# Patient Record
Sex: FEMALE | Race: WHITE | NOT HISPANIC OR LATINO | Employment: PART TIME | URBAN - METROPOLITAN AREA
[De-identification: names, ages, dates, MRNs, and addresses within clinical notes are randomized per-mention and may not be internally consistent; named-entity substitution may affect disease eponyms.]

---

## 2022-02-16 ENCOUNTER — OFFICE VISIT (OUTPATIENT)
Dept: FAMILY MEDICINE CLINIC | Facility: CLINIC | Age: 27
End: 2022-02-16

## 2022-02-16 VITALS
TEMPERATURE: 98 F | WEIGHT: 132 LBS | SYSTOLIC BLOOD PRESSURE: 100 MMHG | HEIGHT: 64 IN | BODY MASS INDEX: 22.53 KG/M2 | OXYGEN SATURATION: 100 % | HEART RATE: 68 BPM | RESPIRATION RATE: 16 BRPM | DIASTOLIC BLOOD PRESSURE: 80 MMHG

## 2022-02-16 DIAGNOSIS — Z12.4 PAP SMEAR FOR CERVICAL CANCER SCREENING: ICD-10-CM

## 2022-02-16 DIAGNOSIS — L98.9 SKIN LESION OF BACK: ICD-10-CM

## 2022-02-16 DIAGNOSIS — Z11.59 ENCOUNTER FOR HEPATITIS C SCREENING TEST FOR LOW RISK PATIENT: ICD-10-CM

## 2022-02-16 DIAGNOSIS — Z13.6 SCREENING, ISCHEMIC HEART DISEASE: ICD-10-CM

## 2022-02-16 DIAGNOSIS — N63.10 BREAST MASS, RIGHT: ICD-10-CM

## 2022-02-16 DIAGNOSIS — Z00.00 ENCOUNTER FOR HEALTH MAINTENANCE EXAMINATION IN ADULT: Primary | ICD-10-CM

## 2022-02-16 PROCEDURE — 99385 PREV VISIT NEW AGE 18-39: CPT

## 2022-02-16 PROCEDURE — 99204 OFFICE O/P NEW MOD 45 MIN: CPT

## 2022-02-16 NOTE — PROGRESS NOTES
Chief Complaint  Annual Exam and Mass (Right arm pit x 2 weeks)    Subjective          Kandi Fitzpatrick presents to [facility]  History of Present Illness      Patient presents the office to establish care and annual exam.  Patient that is been several years since she has had a primary care provider and cannot remember the last time she was seen.  No past medical history or family history.    Patient did want to discuss today a mass that she noted on the anterior aspect of her right breast at the 11 o'clock position.  Patient noticed it 2 weeks ago.  Patient denies any redness, swelling, or tenderness.  It was an incidental finding.  No other axillary lymphadenopathy.  No issues with the left breast.  Patient said over the past 2 weeks it has not changed at all but remained the same.    Patient said that she also noticed a cystlike lesion in the middle of her back midline.  Patient said its been there for at least 3 months and at first was the same color as her skin but over the past month it is changed colors to a darker gray.  She also noticed that the lesion has grown a little bit over the past month.  No redness, tenderness, or drainage noted with the lesion.  Does not bother her but she would like it to be evaluated and potentially removed.  Patient does not currently see a dermatologist.      Annual Exam.      Health Maintenance   Topic Date Due   • HPV VACCINES (1 - 2-dose series) Never done   • COVID-19 Vaccine (3 - Booster for Pfizer series) 01/20/2022   • HEPATITIS C SCREENING  Never done   • PAP SMEAR  Never done   • TDAP/TD VACCINES (1 - Tdap) 02/15/2023 (Originally 9/22/2015)   • INFLUENZA VACCINE  02/16/2023 (Originally 8/1/2021)   • ANNUAL PHYSICAL  02/17/2023   • Pneumococcal Vaccine 0-64  Aged Out       Diet: Well balanced overall.  Exercise: Weight lifting 4 times a week. Walks dog.  GYN: Referral into GYN.  Immunizations: Has had HPV. Needing records. Believed had tdap  "7 years ago. Declined flu. Otherwise up to date.   Colon cancer screening: NA  Sleep/mental health: Sleeps well around 6-8 hours a night. Mental health good with good support. PHQ 9 is 0.  Dental: needing to set up appointment  Rare alcohol and no tobacco use.       Objective   Vital Signs:   /80   Pulse 68   Temp 98 °F (36.7 °C)   Resp 16   Ht 162.6 cm (64\")   Wt 59.9 kg (132 lb)   SpO2 100%   BMI 22.66 kg/m²     Physical Exam  HENT:      Head: Normocephalic.      Mouth/Throat:      Mouth: Mucous membranes are moist.   Eyes:      Pupils: Pupils are equal, round, and reactive to light.   Cardiovascular:      Rate and Rhythm: Normal rate and regular rhythm.      Pulses: Normal pulses.      Heart sounds: Normal heart sounds.   Pulmonary:      Effort: Pulmonary effort is normal.      Breath sounds: Normal breath sounds.   Chest:   Breasts:      Right: Mass present. No tenderness.       Abdominal:      General: Bowel sounds are normal.      Palpations: Abdomen is soft.   Musculoskeletal:         General: Normal range of motion.      Cervical back: Normal range of motion.   Skin:     General: Skin is warm and dry.      Capillary Refill: Capillary refill takes less than 2 seconds.      Findings: Lesion present.      Comments: 3 cm x 3 cm epidermal cyst midline back.  Appears to be fluid-filled with possibly blood.  No redness or swelling surrounding it.  No signs of infection or drainage.   Neurological:      General: No focal deficit present.      Mental Status: She is alert.   Psychiatric:         Mood and Affect: Mood normal.        Result Review :                 Assessment and Plan    Diagnoses and all orders for this visit:    1. Encounter for health maintenance examination in adult (Primary)  Assessment & Plan:  Pleasant 26-year-old female here for annual physical exam.  Counseled to continue to eat a diet high in vegetable, lean meat, whole grains and to exercise 150 minutes a week.  Needing records " for HPV and tetanus vaccine should she believes she is up-to-date on those.  Patient declined flu vaccine.  Patient has not received the Covid booster.  Referral was placed for GYN to obtain Pap smear.    Orders:  -     CBC & Differential  -     Comprehensive Metabolic Panel    2. Breast mass, right  Assessment & Plan:  2 cm mass on palpation at the 11 o'clock position of the right breast.  Nontender and fixed.  Obtaining ultrasound of the right breast and mammogram.  We will follow-up when the ultrasound has resulted.    Orders:  -     US breast right complete; Future  -     Mammo Diagnostic Bilateral With CAD; Future    3. Skin lesion of back  Assessment & Plan:  Referral into dermatology.  At this time the lesion does not appear to be inflamed or infected.  Appears to possibly be blood-filled.  Counseled patient that if it changes in size or she notices any redness, swelling, tenderness to immediately let me know and I will send in antibiotics.  Gave patient information for associates in dermatology so that she can go ahead and give them a call to get in and soon as possible.    Orders:  -     Ambulatory Referral to Dermatology    4. Screening, ischemic heart disease  -     Lipid Panel    5. Encounter for hepatitis C screening test for low risk patient  -     Hepatitis C antibody    6. Pap smear for cervical cancer screening  -     Ambulatory Referral to Obstetrics / Gynecology      Follow Up   Return in about 1 year (around 2/16/2023) for Annual physical.  Patient was given instructions and counseling regarding her condition or for health maintenance advice. Please see specific information pulled into the AVS if appropriate.     Medical assistant and I wore mask and eyewear protection during entire encounter.  Patient wore mask.      Electronically signed by ROHIT Cruz, 02/16/22, 3:39 PM EST.

## 2022-02-16 NOTE — ASSESSMENT & PLAN NOTE
Pleasant 26-year-old female here for annual physical exam.  Counseled to continue to eat a diet high in vegetable, lean meat, whole grains and to exercise 150 minutes a week.  Needing records for HPV and tetanus vaccine should she believes she is up-to-date on those.  Patient declined flu vaccine.  Patient has not received the Covid booster.  Referral was placed for GYN to obtain Pap smear.

## 2022-02-16 NOTE — ASSESSMENT & PLAN NOTE
2 cm mass on palpation at the 11 o'clock position of the right breast.  Nontender and fixed.  Obtaining ultrasound of the right breast and mammogram.  We will follow-up when the ultrasound has resulted.

## 2022-02-16 NOTE — ASSESSMENT & PLAN NOTE
Referral into dermatology.  At this time the lesion does not appear to be inflamed or infected.  Appears to possibly be blood-filled.  Counseled patient that if it changes in size or she notices any redness, swelling, tenderness to immediately let me know and I will send in antibiotics.  Gave patient information for associates in dermatology so that she can go ahead and give them a call to get in and soon as possible.

## 2022-02-17 LAB
ALBUMIN SERPL-MCNC: 4.6 G/DL (ref 3.9–5)
ALBUMIN/GLOB SERPL: 1.6 {RATIO} (ref 1.2–2.2)
ALP SERPL-CCNC: 78 IU/L (ref 44–121)
ALT SERPL-CCNC: 20 IU/L (ref 0–32)
AST SERPL-CCNC: 20 IU/L (ref 0–40)
BASOPHILS # BLD AUTO: 0 X10E3/UL (ref 0–0.2)
BASOPHILS NFR BLD AUTO: 1 %
BILIRUB SERPL-MCNC: <0.2 MG/DL (ref 0–1.2)
BUN SERPL-MCNC: 18 MG/DL (ref 6–20)
BUN/CREAT SERPL: 26 (ref 9–23)
CALCIUM SERPL-MCNC: 9.4 MG/DL (ref 8.7–10.2)
CHLORIDE SERPL-SCNC: 108 MMOL/L (ref 96–106)
CHOLEST SERPL-MCNC: 153 MG/DL (ref 100–199)
CO2 SERPL-SCNC: 20 MMOL/L (ref 20–29)
CREAT SERPL-MCNC: 0.68 MG/DL (ref 0.57–1)
EOSINOPHIL # BLD AUTO: 0.2 X10E3/UL (ref 0–0.4)
EOSINOPHIL NFR BLD AUTO: 3 %
ERYTHROCYTE [DISTWIDTH] IN BLOOD BY AUTOMATED COUNT: 14.7 % (ref 11.7–15.4)
GLOBULIN SER CALC-MCNC: 2.9 G/DL (ref 1.5–4.5)
GLUCOSE SERPL-MCNC: 97 MG/DL (ref 65–99)
HCT VFR BLD AUTO: 34.5 % (ref 34–46.6)
HCV AB S/CO SERPL IA: <0.1 S/CO RATIO (ref 0–0.9)
HDLC SERPL-MCNC: 73 MG/DL
HGB BLD-MCNC: 10.6 G/DL (ref 11.1–15.9)
IMM GRANULOCYTES # BLD AUTO: 0 X10E3/UL (ref 0–0.1)
IMM GRANULOCYTES NFR BLD AUTO: 0 %
LDLC SERPL CALC-MCNC: 69 MG/DL (ref 0–99)
LYMPHOCYTES # BLD AUTO: 1.4 X10E3/UL (ref 0.7–3.1)
LYMPHOCYTES NFR BLD AUTO: 28 %
MCH RBC QN AUTO: 24.1 PG (ref 26.6–33)
MCHC RBC AUTO-ENTMCNC: 30.7 G/DL (ref 31.5–35.7)
MCV RBC AUTO: 79 FL (ref 79–97)
MONOCYTES # BLD AUTO: 0.3 X10E3/UL (ref 0.1–0.9)
MONOCYTES NFR BLD AUTO: 6 %
NEUTROPHILS # BLD AUTO: 3.2 X10E3/UL (ref 1.4–7)
NEUTROPHILS NFR BLD AUTO: 62 %
PLATELET # BLD AUTO: 295 X10E3/UL (ref 150–450)
POTASSIUM SERPL-SCNC: 4 MMOL/L (ref 3.5–5.2)
PROT SERPL-MCNC: 7.5 G/DL (ref 6–8.5)
RBC # BLD AUTO: 4.39 X10E6/UL (ref 3.77–5.28)
SODIUM SERPL-SCNC: 141 MMOL/L (ref 134–144)
TRIGL SERPL-MCNC: 50 MG/DL (ref 0–149)
VLDLC SERPL CALC-MCNC: 11 MG/DL (ref 5–40)
WBC # BLD AUTO: 5.1 X10E3/UL (ref 3.4–10.8)

## 2022-02-18 DIAGNOSIS — D64.9 LOW HEMOGLOBIN: Primary | ICD-10-CM

## 2022-03-18 ENCOUNTER — HOSPITAL ENCOUNTER (OUTPATIENT)
Dept: ULTRASOUND IMAGING | Facility: HOSPITAL | Age: 27
Discharge: HOME | End: 2022-03-18

## 2022-03-18 ENCOUNTER — HOSPITAL ENCOUNTER (OUTPATIENT)
Dept: MAMMOGRAPHY | Facility: HOSPITAL | Age: 27
End: 2022-03-18

## 2022-03-18 DIAGNOSIS — N63.10 BREAST MASS, RIGHT: ICD-10-CM

## 2022-03-18 PROCEDURE — 76642 ULTRASOUND BREAST LIMITED: CPT

## 2022-03-22 ENCOUNTER — TELEPHONE (OUTPATIENT)
Dept: FAMILY MEDICINE CLINIC | Facility: CLINIC | Age: 27
End: 2022-03-22

## 2022-03-22 NOTE — TELEPHONE ENCOUNTER
Caller: Kandi Fitzpatrick    Relationship: Self    Best call back number: [phone]    What was the call regarding: ULTRASOUND RESULTS    Do you require a callback: YES

## 2023-04-10 ENCOUNTER — OFFICE VISIT (OUTPATIENT)
Dept: FAMILY MEDICINE CLINIC | Facility: CLINIC | Age: 28
End: 2023-04-10
Payer: COMMERCIAL

## 2023-04-10 VITALS
DIASTOLIC BLOOD PRESSURE: 82 MMHG | TEMPERATURE: 97.4 F | HEIGHT: 64 IN | WEIGHT: 131 LBS | OXYGEN SATURATION: 99 % | BODY MASS INDEX: 22.36 KG/M2 | HEART RATE: 88 BPM | RESPIRATION RATE: 16 BRPM | SYSTOLIC BLOOD PRESSURE: 104 MMHG

## 2023-04-10 DIAGNOSIS — Z00.00 ENCOUNTER FOR ANNUAL PHYSICAL EXAM: Primary | ICD-10-CM

## 2023-04-10 DIAGNOSIS — L98.9 SKIN LESION OF BACK: ICD-10-CM

## 2023-04-10 PROCEDURE — 96127 BRIEF EMOTIONAL/BEHAV ASSMT: CPT | Performed by: FAMILY MEDICINE

## 2023-04-10 PROCEDURE — 81003 URINALYSIS AUTO W/O SCOPE: CPT | Performed by: FAMILY MEDICINE

## 2023-04-10 NOTE — PROGRESS NOTES
Patient Care Team:  Kane Martinez MD as PCP - General (Urgent Care)     Chief complaint: Patient is in today for a physical          Patient is a 27 y.o. female who presents for her yearly physical exam.     HPI     Pt usually sees ROHIT Marion, today to establish care with me, physical and to discuss the following ;    Diet: eating HD  Exercise:  exercising regularly.   Smoking hx ; not smoker   Immunizations: reviewed and discussed.   Never had PAP smear before     Patient noticed a cystlike lesion in the middle of her back midline for 1 year, it gets bigger. Patient does not currently see a dermatologist.    Health maintenance/lifestyle:  Immunization History   Administered Date(s) Administered   • COVID-19 (PFIZER) PURPLE CAP 07/30/2021, 08/20/2021        PHQ-2 Depression Screening  Little interest or pleasure in doing things? 0-->not at all   Feeling down, depressed, or hopeless? 0-->not at all   PHQ-2 Total Score 0         Social History     Tobacco Use   Smoking Status Never   Smokeless Tobacco Never     Social History     Substance and Sexual Activity   Alcohol Use Yes    Comment: 1-2 times a month         Review of Systems   Gastrointestinal: Negative for constipation and diarrhea.   Genitourinary: Negative for dysuria and frequency.   Skin: Positive for skin lesions.         History  History reviewed. No pertinent past medical history.   History reviewed. No pertinent surgical history.   No Known Allergies   History reviewed. No pertinent family history.  Social History     Socioeconomic History   • Marital status: Single   Tobacco Use   • Smoking status: Never   • Smokeless tobacco: Never   Vaping Use   • Vaping Use: Never used   Substance and Sexual Activity   • Alcohol use: Yes     Comment: 1-2 times a month   • Drug use: Yes     Types: Marijuana   • Sexual activity: Yes     Partners: Male     Birth control/protection: Condom      No current outpatient medications on file.                  BP  "104/82   Pulse 88   Temp 97.4 °F (36.3 °C)   Resp 16   Ht 162.6 cm (64\")   Wt 59.4 kg (131 lb)   SpO2 99%   BMI 22.49 kg/m²       Physical Exam  Vitals and nursing note reviewed.   Constitutional:       General: She is not in acute distress.     Appearance: She is not ill-appearing, toxic-appearing or diaphoretic.   HENT:      Head: Normocephalic.      Right Ear: Tympanic membrane, ear canal and external ear normal.      Left Ear: Tympanic membrane, ear canal and external ear normal.      Nose: Nose normal. No congestion or rhinorrhea.      Mouth/Throat:      Mouth: Mucous membranes are moist.      Pharynx: Oropharynx is clear. No oropharyngeal exudate or posterior oropharyngeal erythema.   Eyes:      General: No scleral icterus.        Right eye: No discharge.         Left eye: No discharge.      Extraocular Movements: Extraocular movements intact.      Conjunctiva/sclera: Conjunctivae normal.      Pupils: Pupils are equal, round, and reactive to light.   Neck:      Comments: No enlarged thyroid      Cardiovascular:      Rate and Rhythm: Normal rate and regular rhythm.      Heart sounds: Normal heart sounds. No murmur heard.    No friction rub. No gallop.   Pulmonary:      Effort: Pulmonary effort is normal. No respiratory distress.      Breath sounds: Normal breath sounds. No stridor. No wheezing, rhonchi or rales.   Abdominal:      General: Bowel sounds are normal. There is no distension.      Palpations: Abdomen is soft. There is no mass.      Tenderness: There is no abdominal tenderness. There is no right CVA tenderness, left CVA tenderness, guarding or rebound.      Hernia: No hernia is present.   Musculoskeletal:         General: Normal range of motion.      Cervical back: Normal range of motion and neck supple.      Right lower leg: No edema.      Left lower leg: No edema.   Lymphadenopathy:      Cervical: No cervical adenopathy.   Skin:     General: Skin is warm.      Coloration: Skin is not " jaundiced or pale.      Findings: Lesion present. No erythema or rash.      Comments: ~ 2 x 3 cm mass at the mid of her back  No redness   Neurological:      General: No focal deficit present.      Mental Status: She is alert and oriented to person, place, and time.      Cranial Nerves: No cranial nerve deficit.      Sensory: No sensory deficit.      Motor: No weakness.      Coordination: Coordination normal.      Gait: Gait normal.      Deep Tendon Reflexes: Reflexes normal.   Psychiatric:         Mood and Affect: Mood normal.         Behavior: Behavior normal.         Thought Content: Thought content normal.         Judgment: Judgment normal.                   Diagnoses and all orders for this visit:    1. Encounter for annual physical exam (Primary)  -     POC Urinalysis Dipstick, Automated  -     CBC (No Diff); Future  -     Comprehensive Metabolic Panel; Future    2. Skin lesion of back  -     Ambulatory Referral to Dermatology        -Age and sex appropriate physical exam performed and documented.   -Updated past medical, family, social and surgical histories as well as allergies and care team list.   -Addressed care gaps listed in the medical record.  -Encouraged annual dental and vision exams as part of their overall health.  -Encouraged minimum of 30 minutes or more of exercise at a brisk walk or higher 5 days per week combined with a well-balanced diet.        Follow up: Return in about 2 months (around 6/14/2023) for PAP.  Plan of care discussed with pt. They verbalized understanding and agreement.     Kane Martinez MD   4/11/2023   16:52 EDT

## 2023-04-11 LAB
BILIRUB BLD-MCNC: NEGATIVE MG/DL
CLARITY, POC: CLEAR
COLOR UR: YELLOW
EXPIRATION DATE: ABNORMAL
GLUCOSE UR STRIP-MCNC: NEGATIVE MG/DL
KETONES UR QL: NEGATIVE
LEUKOCYTE EST, POC: NEGATIVE
Lab: ABNORMAL
NITRITE UR-MCNC: NEGATIVE MG/ML
PH UR: 6 [PH] (ref 5–8)
PROT UR STRIP-MCNC: NEGATIVE MG/DL
RBC # UR STRIP: ABNORMAL /UL
SP GR UR: 1.02 (ref 1–1.03)
UROBILINOGEN UR QL: ABNORMAL